# Patient Record
Sex: MALE | Race: WHITE | NOT HISPANIC OR LATINO
[De-identification: names, ages, dates, MRNs, and addresses within clinical notes are randomized per-mention and may not be internally consistent; named-entity substitution may affect disease eponyms.]

---

## 2019-11-01 PROBLEM — Z00.00 ENCOUNTER FOR PREVENTIVE HEALTH EXAMINATION: Status: ACTIVE | Noted: 2019-11-01

## 2019-11-04 ENCOUNTER — APPOINTMENT (OUTPATIENT)
Dept: INTERNAL MEDICINE | Facility: CLINIC | Age: 59
End: 2019-11-04
Payer: COMMERCIAL

## 2019-11-04 ENCOUNTER — NON-APPOINTMENT (OUTPATIENT)
Age: 59
End: 2019-11-04

## 2019-11-04 VITALS
BODY MASS INDEX: 29.16 KG/M2 | SYSTOLIC BLOOD PRESSURE: 140 MMHG | DIASTOLIC BLOOD PRESSURE: 80 MMHG | HEART RATE: 75 BPM | OXYGEN SATURATION: 94 % | HEIGHT: 73 IN | WEIGHT: 220 LBS

## 2019-11-04 DIAGNOSIS — Z80.9 FAMILY HISTORY OF MALIGNANT NEOPLASM, UNSPECIFIED: ICD-10-CM

## 2019-11-04 DIAGNOSIS — Z83.438 FAMILY HISTORY OF OTHER DISORDER OF LIPOPROTEIN METABOLISM AND OTHER LIPIDEMIA: ICD-10-CM

## 2019-11-04 DIAGNOSIS — Z82.49 FAMILY HISTORY OF ISCHEMIC HEART DISEASE AND OTHER DISEASES OF THE CIRCULATORY SYSTEM: ICD-10-CM

## 2019-11-04 DIAGNOSIS — Z87.891 PERSONAL HISTORY OF NICOTINE DEPENDENCE: ICD-10-CM

## 2019-11-04 PROCEDURE — 94010 BREATHING CAPACITY TEST: CPT

## 2019-11-04 PROCEDURE — 99203 OFFICE O/P NEW LOW 30 MIN: CPT | Mod: 25

## 2019-11-04 RX ORDER — ELETRIPTAN HYDROBROMIDE 40 MG/1
40 TABLET, FILM COATED ORAL
Qty: 36 | Refills: 0 | Status: ACTIVE | COMMUNITY
Start: 2019-10-14

## 2019-11-04 RX ORDER — VERAPAMIL HYDROCHLORIDE 120 MG/1
120 TABLET ORAL
Qty: 180 | Refills: 0 | Status: ACTIVE | COMMUNITY
Start: 2019-07-30

## 2019-11-04 RX ORDER — MONTELUKAST 10 MG/1
10 TABLET, FILM COATED ORAL
Qty: 90 | Refills: 0 | Status: ACTIVE | COMMUNITY
Start: 2019-06-10

## 2019-11-04 RX ORDER — IPRATROPIUM BROMIDE AND ALBUTEROL SULFATE 2.5; .5 MG/3ML; MG/3ML
0.5-2.5 (3) SOLUTION RESPIRATORY (INHALATION)
Qty: 540 | Refills: 0 | Status: ACTIVE | COMMUNITY
Start: 2019-08-07

## 2019-11-04 RX ORDER — TIOTROPIUM BROMIDE INHALATION SPRAY 3.12 UG/1
2.5 SPRAY, METERED RESPIRATORY (INHALATION)
Qty: 4 | Refills: 0 | Status: ACTIVE | COMMUNITY
Start: 2019-07-08

## 2019-11-04 NOTE — ASSESSMENT
[FreeTextEntry1] : Although this patient carries a diagnosis of COPD and asthma his current spirometry reveals only a mild restrictive impairment consistent with his overweight status. 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 Patient is currently totally asymptomatic. I feel there is no contraindication to proposed total hip replacement. Patient is to continue with Symbicort and Spiriva on a regular basis. He will return to see me on a p.r.n. basis.

## 2019-11-04 NOTE — HISTORY OF PRESENT ILLNESS
[FreeTextEntry1] : Preop pulmonary evaluation [de-identified] : This is a 59-year-old male who is scheduled for right total hip replacement on 11/13. Patient is a former one and a half pack per day smoker for 20 years. He quit in 1995. He carries a diagnosis of both COPD and asthma. He gets slightly short of breath at times especially with exposure to tree pollen and with hot humid weather conditions. He denies chronic cough. He rarely wheezes. He has allergies to tree pollen and received allergy injections starting in the spring. He currently is on Spiriva, Symbicort, Singulair and Allegra daily. When he is generally feeling well he has mild shortness of breath with 2 flights of stairs but no shortness of breath on level ground. He is now somewhat limited because of his hip pain. He last took prednisone in August. There've been no significant exacerbations of his lung condition. He has not been hospitalized with lung disease. Of note the patient had a left total hip replacement in 2016 without problems. He has a history of sleep apnea and wears CPAP. Past medical history significant for BPH. Past surgery includes left total hip replacement and TURP 10 years ago. Medications were reviewed. Allergies to Levaquin, sulfa and penicillin. Social history he stopped drinking in 1991.

## 2019-11-06 ENCOUNTER — APPOINTMENT (OUTPATIENT)
Dept: INTERNAL MEDICINE | Facility: CLINIC | Age: 59
End: 2019-11-06
Payer: COMMERCIAL

## 2019-11-06 VITALS
HEART RATE: 64 BPM | TEMPERATURE: 97.8 F | SYSTOLIC BLOOD PRESSURE: 122 MMHG | DIASTOLIC BLOOD PRESSURE: 70 MMHG | WEIGHT: 224 LBS | HEIGHT: 73 IN | BODY MASS INDEX: 29.69 KG/M2

## 2019-11-06 DIAGNOSIS — G47.33 OBSTRUCTIVE SLEEP APNEA (ADULT) (PEDIATRIC): ICD-10-CM

## 2019-11-06 DIAGNOSIS — M16.11 UNILATERAL PRIMARY OSTEOARTHRITIS, RIGHT HIP: ICD-10-CM

## 2019-11-06 DIAGNOSIS — J45.909 UNSPECIFIED ASTHMA, UNCOMPLICATED: ICD-10-CM

## 2019-11-06 DIAGNOSIS — Z01.818 ENCOUNTER FOR OTHER PREPROCEDURAL EXAMINATION: ICD-10-CM

## 2019-11-06 DIAGNOSIS — Z99.89 OBSTRUCTIVE SLEEP APNEA (ADULT) (PEDIATRIC): ICD-10-CM

## 2019-11-06 DIAGNOSIS — J43.9 EMPHYSEMA, UNSPECIFIED: ICD-10-CM

## 2019-11-06 DIAGNOSIS — G43.909 MIGRAINE, UNSPECIFIED, NOT INTRACTABLE, W/OUT STATUS MIGRAINOSUS: ICD-10-CM

## 2019-11-06 DIAGNOSIS — M79.7 FIBROMYALGIA: ICD-10-CM

## 2019-11-06 DIAGNOSIS — N40.0 BENIGN PROSTATIC HYPERPLASIA WITHOUT LOWER URINARY TRACT SYMPMS: ICD-10-CM

## 2019-11-06 PROCEDURE — 99244 OFF/OP CNSLTJ NEW/EST MOD 40: CPT

## 2019-11-06 RX ORDER — AZELASTINE HYDROCHLORIDE 137 UG/1
137 SPRAY, METERED NASAL
Qty: 90 | Refills: 0 | Status: DISCONTINUED | COMMUNITY
Start: 2019-05-19 | End: 2019-11-06

## 2019-11-06 RX ORDER — DULOXETINE HYDROCHLORIDE 20 MG/1
20 CAPSULE, DELAYED RELEASE PELLETS ORAL
Qty: 30 | Refills: 0 | Status: DISCONTINUED | COMMUNITY
Start: 2019-09-23 | End: 2019-11-06

## 2019-11-06 RX ORDER — PREGABALIN 75 MG/1
75 CAPSULE ORAL
Qty: 180 | Refills: 0 | Status: ACTIVE | COMMUNITY
Start: 2019-07-08

## 2019-11-06 RX ORDER — PREDNISONE 10 MG/1
10 TABLET ORAL
Qty: 21 | Refills: 0 | Status: DISCONTINUED | COMMUNITY
Start: 2019-07-30 | End: 2019-11-06

## 2019-11-06 RX ORDER — BUDESONIDE AND FORMOTEROL FUMARATE DIHYDRATE 160; 4.5 UG/1; UG/1
160-4.5 AEROSOL RESPIRATORY (INHALATION)
Qty: 1 | Refills: 3 | Status: ACTIVE | COMMUNITY
Start: 2019-11-06

## 2019-11-06 RX ORDER — PREDNISONE 20 MG/1
20 TABLET ORAL
Qty: 30 | Refills: 0 | Status: DISCONTINUED | COMMUNITY
Start: 2019-08-12 | End: 2019-11-06

## 2019-11-06 NOTE — ASSESSMENT
[Patient Optimized for Surgery] : Patient optimized for surgery [No Further Testing Recommended] : no further testing recommended [As per surgery] : as per surgery [FreeTextEntry4] : Mr. FLORES is a 59 year yo male with no h/o CAD and good exercise tolerance. He denies CP, palpitation or SOB and his EKG today is normal. He does not take blood thinners and denies urinary obstruction symptoms. He does not have a h/o DVT. He will continue the prescription medications perioperatively as instructed. The morning of the procedure he will only take the Verapamil and Lyrica pills.\par \par Mr. FLORES has a moderate risk for perioperative cardiovascular complications and will undergo the procedure as planned. I will follow him postop.\par \par  ***More than 50% of the face to face time was devoted to counseling and/or coordination of care. The discussion and/or coordination of care included: perioperative medication management.\par \par \par \par

## 2019-11-06 NOTE — PHYSICAL EXAM
[Normal] : normal gait, coordination grossly intact, no focal deficits [de-identified] : R hip c limited and tender ROM

## 2019-11-06 NOTE — HISTORY OF PRESENT ILLNESS
[No Pertinent Cardiac History] : no history of aortic stenosis, atrial fibrillation, coronary artery disease, recent myocardial infarction, or implantable device/pacemaker [Asthma] : asthma [COPD] : COPD [No Adverse Anesthesia Reaction] : no adverse anesthesia reaction in self or family member [Chronic Anticoagulation] : no chronic anticoagulation [Chronic Kidney Disease] : no chronic kidney disease [Diabetes] : no diabetes [(Patient denies any chest pain, claudication, dyspnea on exertion, orthopnea, palpitations or syncope)] : Patient denies any chest pain, claudication, dyspnea on exertion, orthopnea, palpitations or syncope [Good (7-10 METs)] : Good (7-10 METs) [FreeTextEntry1] : BETI ADAME [FreeTextEntry2] : 11/13 [FreeTextEntry3] : dr Luis [FreeTextEntry4] : Dear Dr. Luis : Thank you for referring Mr. FLORES for preoperative evaluation prior to  R THR on  11/13.  He  is complaining o R hip  pain for 2 years getting worse in the last 12 months. Anti-inflammatories and physical therapy  are not helping any longer. has successful L THR in 2016.\par PCP Dr Simeon\par \par